# Patient Record
(demographics unavailable — no encounter records)

---

## 2024-12-27 NOTE — PHYSICAL EXAM
[No Acute Distress] : no acute distress [Well Nourished] : well nourished [PERRL] : pupils equal round and reactive to light [EOMI] : extraocular movements intact [Normal Oropharynx] : the oropharynx was normal [No Lymphadenopathy] : no lymphadenopathy [No Respiratory Distress] : no respiratory distress  [Clear to Auscultation] : lungs were clear to auscultation bilaterally [Normal Rate] : normal rate  [Normal S1, S2] : normal S1 and S2 [Soft] : abdomen soft [Non Tender] : non-tender [Non-distended] : non-distended [Normal Supraclavicular Nodes] : no supraclavicular lymphadenopathy [Normal Posterior Cervical Nodes] : no posterior cervical lymphadenopathy [Normal Anterior Cervical Nodes] : no anterior cervical lymphadenopathy [No Rash] : no rash [Coordination Grossly Intact] : coordination grossly intact [No Focal Deficits] : no focal deficits [Normal Affect] : the affect was normal [Normal Insight/Judgement] : insight and judgment were intact

## 2024-12-28 NOTE — HISTORY OF PRESENT ILLNESS
[FreeTextEntry8] : 28 y/o F presenting today as a new patient for a visit to address birth control options. She reports being sexually active with one male partner. Reports having regular menstrual cycle. LMP 12/8/24. Reports currently she uses condom for BC. Denied having any other active complaints for today.

## 2024-12-28 NOTE — HISTORY OF PRESENT ILLNESS
[FreeTextEntry8] : 26 y/o F presenting today as a new patient for a visit to address birth control options. She reports being sexually active with one male partner. Reports having regular menstrual cycle. LMP 12/8/24. Reports currently she uses condom for BC. Denied having any other active complaints for today.

## 2024-12-28 NOTE — PLAN
[FreeTextEntry1] : # Contraception management - Gave patient a pamphlet on various different contraception options - Patient will talk to her partner about it and get back to us   RTC for CPE (labs ordered) and pap smear CSP  Case d/w Dr. Felix

## 2024-12-28 NOTE — INTERPRETER SERVICES
[Interpreters_IDNumber] : [id number] [Interpreters_FullName] : Hermilo [TWNoteComboBox1] : [place]

## 2025-01-03 NOTE — PHYSICAL EXAM
[No Acute Distress] : no acute distress [Well Nourished] : well nourished [Well Developed] : well developed [Well-Appearing] : well-appearing [No Respiratory Distress] : no respiratory distress  [No Accessory Muscle Use] : no accessory muscle use [Clear to Auscultation] : lungs were clear to auscultation bilaterally [Normal Rate] : normal rate  [Regular Rhythm] : with a regular rhythm [Normal S1, S2] : normal S1 and S2 [Soft] : abdomen soft [Non Tender] : non-tender [Non-distended] : non-distended [Normal Bowel Sounds] : normal bowel sounds [Normal Affect] : the affect was normal [Normal Insight/Judgement] : insight and judgment were intact

## 2025-01-05 NOTE — REVIEW OF SYSTEMS
[Fever] : no fever [Chills] : no chills [Fatigue] : no fatigue [Chest Pain] : no chest pain [Palpitations] : no palpitations [Shortness Of Breath] : no shortness of breath [Wheezing] : no wheezing [Cough] : no cough [Abdominal Pain] : no abdominal pain [Nausea] : no nausea [Constipation] : no constipation [Diarrhea] : diarrhea [Vomiting] : no vomiting [Joint Pain] : no joint pain [Joint Stiffness] : no joint stiffness [Joint Swelling] : no joint swelling [Muscle Pain] : no muscle pain

## 2025-01-05 NOTE — HISTORY OF PRESENT ILLNESS
[FreeTextEntry1] : f/up [de-identified] : Pt is a 26 yo F with no significant PMHx presenting for pap smear and to discuss BC options. Pt is currently on her menstrual cycle, started 12/31, will defer pap smear to next visit.   Discussed contraception options with patient in detail. Pt would like to pursue Nexplanon.  Recent lab results reviewed with patient.

## 2025-01-05 NOTE — HISTORY OF PRESENT ILLNESS
[de-identified] : Pt is a 28 yo F with no significant PMHx presenting for pap smear and to discuss BC options. Pt is currently on her menstrual cycle, started 12/31, will defer pap smear to next visit.   Discussed contraception options with patient in detail. Pt would like to pursue Nexplanon.  Recent lab results reviewed with patient.       [FreeTextEntry1] : f/up

## 2025-02-01 NOTE — PAST MEDICAL HISTORY
[Menstruating] : menstruating [Definite ___ (Date)] : the last menstrual period was [unfilled] [Normal Amount/Duration] : it was of a normal amount and duration [Regular Cycle Intervals] : have been regular [Total Preg ___] : G[unfilled] [Abortions ___] : Abortions:[unfilled] [AB Spont ___] : miscarriages: [unfilled]

## 2025-02-06 NOTE — HISTORY OF PRESENT ILLNESS
[FreeTextEntry1] : CPE  [de-identified] : 27 year old female with no significant PMH presenting for annual physical exam. Reports doing well. No acute complaints. Would like pap smear today since she has never done one. No acute complaints.

## 2025-02-06 NOTE — HISTORY OF PRESENT ILLNESS
[FreeTextEntry8] : 27F presenting for immigration vaccines. She needs MMR and varicella today. She says she had her COVID vaccine yesterday in her left arm and it aches.

## 2025-02-06 NOTE — HISTORY OF PRESENT ILLNESS
[FreeTextEntry1] : CPE  [de-identified] : 27 year old female with no significant PMH presenting for annual physical exam. Reports doing well. No acute complaints. Would like pap smear today since she has never done one. No acute complaints.

## 2025-02-06 NOTE — PHYSICAL EXAM
[No Acute Distress] : no acute distress [Well Nourished] : well nourished [Well Developed] : well developed [Well-Appearing] : well-appearing [Normal Sclera/Conjunctiva] : normal sclera/conjunctiva [PERRL] : pupils equal round and reactive to light [EOMI] : extraocular movements intact [Normal Outer Ear/Nose] : the outer ears and nose were normal in appearance [Normal Oropharynx] : the oropharynx was normal [No JVD] : no jugular venous distention [No Lymphadenopathy] : no lymphadenopathy [Supple] : supple [Thyroid Normal, No Nodules] : the thyroid was normal and there were no nodules present [No Respiratory Distress] : no respiratory distress  [No Accessory Muscle Use] : no accessory muscle use [Clear to Auscultation] : lungs were clear to auscultation bilaterally [Normal Rate] : normal rate  [Regular Rhythm] : with a regular rhythm [Normal S1, S2] : normal S1 and S2 [No Murmur] : no murmur heard [No Carotid Bruits] : no carotid bruits [No Abdominal Bruit] : a ~M bruit was not heard ~T in the abdomen [No Varicosities] : no varicosities [Pedal Pulses Present] : the pedal pulses are present [No Edema] : there was no peripheral edema [No Palpable Aorta] : no palpable aorta [No Extremity Clubbing/Cyanosis] : no extremity clubbing/cyanosis [Soft] : abdomen soft [Non Tender] : non-tender [Non-distended] : non-distended [No Masses] : no abdominal mass palpated [No HSM] : no HSM [Normal Bowel Sounds] : normal bowel sounds [No Rash] : no rash [Coordination Grossly Intact] : coordination grossly intact [No Focal Deficits] : no focal deficits [Normal Gait] : normal gait [Deep Tendon Reflexes (DTR)] : deep tendon reflexes were 2+ and symmetric [Normal Affect] : the affect was normal [Normal Insight/Judgement] : insight and judgment were intact [Normal TMs] : both tympanic membranes were normal [Discharge] : ~M discharge [Heavy] : heavy [White] : white [Thin] : thin [Purulent] : purulent [No Bleeding] : no active bleeding [Atrophy] : no atrophy [Erythematous] : no erythema [Dry Mucosa] : a moist mucosa [Foreign Body] : no foreign body in the vault [Crusts] : no crusts [Vesicles] : no vesicles [Papules] : no papules [Ulcer ___cm] : no ulcers [Mass ___ cm] : no masses [de-identified] :  Introduced self to patient and explained my role as resident physician. Explained need and purpose of pap test and obtained patients consent. Chaperone: SAAD Zazueta

## 2025-02-06 NOTE — HISTORY OF PRESENT ILLNESS
[FreeTextEntry1] : CPE  [de-identified] : 27 year old female with no significant PMH presenting for annual physical exam. Reports doing well. No acute complaints. Would like pap smear today since she has never done one. No acute complaints.

## 2025-02-06 NOTE — PHYSICAL EXAM
[No Acute Distress] : no acute distress [Well Nourished] : well nourished [Well Developed] : well developed [Well-Appearing] : well-appearing [Normal Sclera/Conjunctiva] : normal sclera/conjunctiva [PERRL] : pupils equal round and reactive to light [EOMI] : extraocular movements intact [Normal Outer Ear/Nose] : the outer ears and nose were normal in appearance [Normal Oropharynx] : the oropharynx was normal [No JVD] : no jugular venous distention [No Lymphadenopathy] : no lymphadenopathy [Supple] : supple [Thyroid Normal, No Nodules] : the thyroid was normal and there were no nodules present [No Respiratory Distress] : no respiratory distress  [No Accessory Muscle Use] : no accessory muscle use [Clear to Auscultation] : lungs were clear to auscultation bilaterally [Normal Rate] : normal rate  [Regular Rhythm] : with a regular rhythm [Normal S1, S2] : normal S1 and S2 [No Murmur] : no murmur heard [No Carotid Bruits] : no carotid bruits [No Abdominal Bruit] : a ~M bruit was not heard ~T in the abdomen [No Varicosities] : no varicosities [Pedal Pulses Present] : the pedal pulses are present [No Edema] : there was no peripheral edema [No Palpable Aorta] : no palpable aorta [No Extremity Clubbing/Cyanosis] : no extremity clubbing/cyanosis [Soft] : abdomen soft [Non Tender] : non-tender [Non-distended] : non-distended [No Masses] : no abdominal mass palpated [No HSM] : no HSM [Normal Bowel Sounds] : normal bowel sounds [No Rash] : no rash [Coordination Grossly Intact] : coordination grossly intact [No Focal Deficits] : no focal deficits [Normal Gait] : normal gait [Deep Tendon Reflexes (DTR)] : deep tendon reflexes were 2+ and symmetric [Normal Affect] : the affect was normal [Normal Insight/Judgement] : insight and judgment were intact [Normal TMs] : both tympanic membranes were normal [Discharge] : ~M discharge [Heavy] : heavy [White] : white [Thin] : thin [Purulent] : purulent [No Bleeding] : no active bleeding [Atrophy] : no atrophy [Erythematous] : no erythema [Dry Mucosa] : a moist mucosa [Foreign Body] : no foreign body in the vault [Crusts] : no crusts [Vesicles] : no vesicles [Papules] : no papules [Ulcer ___cm] : no ulcers [Mass ___ cm] : no masses [de-identified] :  Introduced self to patient and explained my role as resident physician. Explained need and purpose of pap test and obtained patients consent. Chaperone: SAAD Zazueta

## 2025-02-06 NOTE — HISTORY OF PRESENT ILLNESS
[FreeTextEntry1] : CPE  [de-identified] : 27 year old female with no significant PMH presenting for annual physical exam. Reports doing well. No acute complaints. Would like pap smear today since she has never done one. No acute complaints.

## 2025-02-06 NOTE — HEALTH RISK ASSESSMENT
[Intercurrent ED visits] : went to ED [No] : In the past 12 months have you used drugs other than those required for medical reasons? No [0] : 2) Feeling down, depressed, or hopeless: Not at all (0) [PHQ-2 Negative - No further assessment needed] : PHQ-2 Negative - No further assessment needed [Never] : Never [NO] : No [HIV Test offered] : HIV Test offered [Hepatitis C test offered] : Hepatitis C test offered [Alone] : lives alone [# of Members in Household ___] :  household currently consist of [unfilled] member(s) [Employed] : employed [Significant Other] : lives with significant other [Sexually Active] : sexually active [Feels Safe at Home] : Feels safe at home [Smoke Detector] : smoke detector [Carbon Monoxide Detector] : carbon monoxide detector [Seat Belt] :  uses seat belt [Sunscreen] : uses sunscreen [de-identified] : 2 months ago; reports clorox getting into eye on R [[id number]] : 0 [Reports changes in vision] : Reports no changes in vision [Reports changes in dental health] : Reports no changes in dental health [FreeTextEntry2] : [employer] [de-identified] : Associates Degree in [place] ( Pre-university in her country)

## 2025-02-06 NOTE — PHYSICAL EXAM
[No Acute Distress] : no acute distress [No Respiratory Distress] : no respiratory distress  [Normal Insight/Judgement] : insight and judgment were intact

## 2025-02-06 NOTE — PHYSICAL EXAM
[No Acute Distress] : no acute distress [Well Nourished] : well nourished [Well Developed] : well developed [Well-Appearing] : well-appearing [Normal Sclera/Conjunctiva] : normal sclera/conjunctiva [PERRL] : pupils equal round and reactive to light [EOMI] : extraocular movements intact [Normal Outer Ear/Nose] : the outer ears and nose were normal in appearance [Normal Oropharynx] : the oropharynx was normal [No JVD] : no jugular venous distention [No Lymphadenopathy] : no lymphadenopathy [Supple] : supple [Thyroid Normal, No Nodules] : the thyroid was normal and there were no nodules present [No Respiratory Distress] : no respiratory distress  [No Accessory Muscle Use] : no accessory muscle use [Clear to Auscultation] : lungs were clear to auscultation bilaterally [Normal Rate] : normal rate  [Regular Rhythm] : with a regular rhythm [Normal S1, S2] : normal S1 and S2 [No Murmur] : no murmur heard [No Carotid Bruits] : no carotid bruits [No Abdominal Bruit] : a ~M bruit was not heard ~T in the abdomen [No Varicosities] : no varicosities [Pedal Pulses Present] : the pedal pulses are present [No Edema] : there was no peripheral edema [No Palpable Aorta] : no palpable aorta [No Extremity Clubbing/Cyanosis] : no extremity clubbing/cyanosis [Soft] : abdomen soft [Non Tender] : non-tender [Non-distended] : non-distended [No Masses] : no abdominal mass palpated [No HSM] : no HSM [Normal Bowel Sounds] : normal bowel sounds [No Rash] : no rash [Coordination Grossly Intact] : coordination grossly intact [No Focal Deficits] : no focal deficits [Normal Gait] : normal gait [Deep Tendon Reflexes (DTR)] : deep tendon reflexes were 2+ and symmetric [Normal Affect] : the affect was normal [Normal Insight/Judgement] : insight and judgment were intact [Normal TMs] : both tympanic membranes were normal [Discharge] : ~M discharge [Heavy] : heavy [White] : white [Thin] : thin [Purulent] : purulent [No Bleeding] : no active bleeding [Atrophy] : no atrophy [Erythematous] : no erythema [Dry Mucosa] : a moist mucosa [Foreign Body] : no foreign body in the vault [Crusts] : no crusts [Vesicles] : no vesicles [Papules] : no papules [Ulcer ___cm] : no ulcers [Mass ___ cm] : no masses [de-identified] :  Introduced self to patient and explained my role as resident physician. Explained need and purpose of pap test and obtained patients consent. Chaperone: SAAD Zazueta

## 2025-02-06 NOTE — HEALTH RISK ASSESSMENT
[Intercurrent ED visits] : went to ED [No] : In the past 12 months have you used drugs other than those required for medical reasons? No [0] : 2) Feeling down, depressed, or hopeless: Not at all (0) [PHQ-2 Negative - No further assessment needed] : PHQ-2 Negative - No further assessment needed [Never] : Never [NO] : No [HIV Test offered] : HIV Test offered [Hepatitis C test offered] : Hepatitis C test offered [Alone] : lives alone [# of Members in Household ___] :  household currently consist of [unfilled] member(s) [Employed] : employed [Significant Other] : lives with significant other [Sexually Active] : sexually active [Feels Safe at Home] : Feels safe at home [Smoke Detector] : smoke detector [Carbon Monoxide Detector] : carbon monoxide detector [Seat Belt] :  uses seat belt [Sunscreen] : uses sunscreen [de-identified] : 2 months ago; reports clorox getting into eye on R [[id number]] : 0 [Reports changes in vision] : Reports no changes in vision [Reports changes in dental health] : Reports no changes in dental health [FreeTextEntry2] : [employer] [de-identified] : Associates Degree in [place] ( Pre-university in her country)

## 2025-02-06 NOTE — HEALTH RISK ASSESSMENT
[Intercurrent ED visits] : went to ED [No] : In the past 12 months have you used drugs other than those required for medical reasons? No [0] : 2) Feeling down, depressed, or hopeless: Not at all (0) [PHQ-2 Negative - No further assessment needed] : PHQ-2 Negative - No further assessment needed [Never] : Never [NO] : No [HIV Test offered] : HIV Test offered [Hepatitis C test offered] : Hepatitis C test offered [Alone] : lives alone [# of Members in Household ___] :  household currently consist of [unfilled] member(s) [Employed] : employed [Significant Other] : lives with significant other [Sexually Active] : sexually active [Feels Safe at Home] : Feels safe at home [Smoke Detector] : smoke detector [Carbon Monoxide Detector] : carbon monoxide detector [Seat Belt] :  uses seat belt [Sunscreen] : uses sunscreen [de-identified] : 2 months ago; reports clorox getting into eye on R [[id number]] : 0 [Reports changes in vision] : Reports no changes in vision [Reports changes in dental health] : Reports no changes in dental health [FreeTextEntry2] : [employer] [de-identified] : Associates Degree in [place] ( Pre-university in her country)

## 2025-02-06 NOTE — PHYSICAL EXAM
[No Acute Distress] : no acute distress [Well Nourished] : well nourished [Well Developed] : well developed [Well-Appearing] : well-appearing [Normal Sclera/Conjunctiva] : normal sclera/conjunctiva [PERRL] : pupils equal round and reactive to light [EOMI] : extraocular movements intact [Normal Outer Ear/Nose] : the outer ears and nose were normal in appearance [Normal Oropharynx] : the oropharynx was normal [No JVD] : no jugular venous distention [No Lymphadenopathy] : no lymphadenopathy [Supple] : supple [Thyroid Normal, No Nodules] : the thyroid was normal and there were no nodules present [No Respiratory Distress] : no respiratory distress  [No Accessory Muscle Use] : no accessory muscle use [Clear to Auscultation] : lungs were clear to auscultation bilaterally [Normal Rate] : normal rate  [Regular Rhythm] : with a regular rhythm [Normal S1, S2] : normal S1 and S2 [No Murmur] : no murmur heard [No Carotid Bruits] : no carotid bruits [No Abdominal Bruit] : a ~M bruit was not heard ~T in the abdomen [No Varicosities] : no varicosities [Pedal Pulses Present] : the pedal pulses are present [No Edema] : there was no peripheral edema [No Palpable Aorta] : no palpable aorta [No Extremity Clubbing/Cyanosis] : no extremity clubbing/cyanosis [Soft] : abdomen soft [Non Tender] : non-tender [Non-distended] : non-distended [No Masses] : no abdominal mass palpated [No HSM] : no HSM [Normal Bowel Sounds] : normal bowel sounds [No Rash] : no rash [Coordination Grossly Intact] : coordination grossly intact [No Focal Deficits] : no focal deficits [Normal Gait] : normal gait [Deep Tendon Reflexes (DTR)] : deep tendon reflexes were 2+ and symmetric [Normal Affect] : the affect was normal [Normal Insight/Judgement] : insight and judgment were intact [Normal TMs] : both tympanic membranes were normal [Discharge] : ~M discharge [Heavy] : heavy [White] : white [Thin] : thin [Purulent] : purulent [No Bleeding] : no active bleeding [Atrophy] : no atrophy [Erythematous] : no erythema [Dry Mucosa] : a moist mucosa [Foreign Body] : no foreign body in the vault [Crusts] : no crusts [Vesicles] : no vesicles [Papules] : no papules [Ulcer ___cm] : no ulcers [Mass ___ cm] : no masses [de-identified] :  Introduced self to patient and explained my role as resident physician. Explained need and purpose of pap test and obtained patients consent. Chaperone: SAAD Zazueta

## 2025-02-06 NOTE — HEALTH RISK ASSESSMENT
[Intercurrent ED visits] : went to ED [No] : In the past 12 months have you used drugs other than those required for medical reasons? No [0] : 2) Feeling down, depressed, or hopeless: Not at all (0) [PHQ-2 Negative - No further assessment needed] : PHQ-2 Negative - No further assessment needed [Never] : Never [NO] : No [HIV Test offered] : HIV Test offered [Hepatitis C test offered] : Hepatitis C test offered [Alone] : lives alone [# of Members in Household ___] :  household currently consist of [unfilled] member(s) [Employed] : employed [Significant Other] : lives with significant other [Sexually Active] : sexually active [Feels Safe at Home] : Feels safe at home [Smoke Detector] : smoke detector [Carbon Monoxide Detector] : carbon monoxide detector [Seat Belt] :  uses seat belt [Sunscreen] : uses sunscreen [de-identified] : 2 months ago; reports clorox getting into eye on R [[id number]] : 0 [Reports changes in vision] : Reports no changes in vision [Reports changes in dental health] : Reports no changes in dental health [FreeTextEntry2] : [employer] [de-identified] : Associates Degree in [place] ( Pre-university in her country)

## 2025-03-11 NOTE — PROCEDURE
[Colposcopy] : colposcopy [LGSIL] : low grade squamous intraepithelial lesion [Patient] : patient [Risks] : risks [Benefits] : benefits [Alternatives] : alternatives [Infection] : infection [Bleeding] : bleeding [Allergic Reaction] : allergic reaction [Consent Obtained] : written consent was obtained prior to the procedure [Ibuprofen ___ mg] : ibuprofen [unfilled] ~Umg [Acetowhite ___ o'clock] : ascetowhite changes at [unfilled] ~Uo'clock [Non-staining ___ o'clock] : no staining at [unfilled] o'clock [No Abnormalities] : no abnormalities seen [Biopsies Taken: # ___] : [unfilled] biopsies taken of the cervix [Biopsy Locations ___ o'clock] : the biopsies were taken at [unfilled] o'clock [ECC Done] : Endocervical curettage was performed.  [Orlando's] : Orlando's solution [Tolerated Well] : the patient tolerated the procedure well [No Complications] : there were no complications [Pap Performed] : a cervical Pap smear was not performed [SCJ Fully Visulized] : the squamocolumnar junction was not fully visualized [FreeTextEntry3] : HPV colpitis at 12 o'clock

## 2025-03-27 NOTE — HISTORY OF PRESENT ILLNESS
[FreeTextEntry1] : Patient presenting for f/u on colposcopy result [de-identified] : 28 yo F with LSIL, recent colposcopy presenting for f/u  Patient has no acute complaints today, colposcopy showed ADRI 2 in background of LSIL, patient given options including monitoring with pap smear in 6 months or referral for LEEP.

## 2025-03-27 NOTE — ASSESSMENT
[FreeTextEntry1] : 26 yo F with ADRI 2 on colposcopy, to f/u with pap smear in 6 months. HPV vaccine in clinic today.

## 2025-03-27 NOTE — ASSESSMENT
[FreeTextEntry1] : 28 yo F with ADRI 2 on colposcopy, to f/u with pap smear in 6 months. HPV vaccine in clinic today.

## 2025-03-27 NOTE — HISTORY OF PRESENT ILLNESS
[FreeTextEntry1] : Patient presenting for f/u on colposcopy result [de-identified] : 26 yo F with LSIL, recent colposcopy presenting for f/u  Patient has no acute complaints today, colposcopy showed ADRI 2 in background of LSIL, patient given options including monitoring with pap smear in 6 months or referral for LEEP.

## 2025-03-27 NOTE — HISTORY OF PRESENT ILLNESS
[FreeTextEntry1] : Patient presenting for f/u on colposcopy result [de-identified] : 26 yo F with LSIL, recent colposcopy presenting for f/u  Patient has no acute complaints today, colposcopy showed ADRI 2 in background of LSIL, patient given options including monitoring with pap smear in 6 months or referral for LEEP.

## 2025-03-27 NOTE — INTERPRETER SERVICES
[Time Spent: ____ minutes] : Total time spent using  services: [unfilled] minutes. The patient's primary language is not English thus required  services. [Interpreters_IDNumber] : [id number] [Interpreters_FullName] : Parish [TWNoteComboBox1] : [place]

## 2025-03-27 NOTE — INTERPRETER SERVICES
[Time Spent: ____ minutes] : Total time spent using  services: [unfilled] minutes. The patient's primary language is not English thus required  services. [Interpreters_IDNumber] : [id number] [Interpreters_FullName] : Parish [TWNoteComboBox1] : [place]

## 2025-03-27 NOTE — HISTORY OF PRESENT ILLNESS
[FreeTextEntry1] : Patient presenting for f/u on colposcopy result [de-identified] : 26 yo F with LSIL, recent colposcopy presenting for f/u  Patient has no acute complaints today, colposcopy showed ADRI 2 in background of LSIL, patient given options including monitoring with pap smear in 6 months or referral for LEEP.

## 2025-03-27 NOTE — INTERPRETER SERVICES
[Time Spent: ____ minutes] : Total time spent using  services: [unfilled] minutes. The patient's primary language is not English thus required  services. [Interpreters_IDNumber] : [id number] [Interpreters_FullName] : Parish [TWNoteComboBox1] : [place]

## 2025-03-27 NOTE — INTERPRETER SERVICES
[Time Spent: ____ minutes] : Total time spent using  services: [unfilled] minutes. The patient's primary language is not English thus required  services. [Interpreters_IDNumber] : [id number] [Interpreters_FullName] : Parish [TWNoteComboBox1] : [place]

## 2025-05-19 NOTE — PHYSICAL EXAM
[No Acute Distress] : no acute distress [Well Nourished] : well nourished [Well Developed] : well developed [PERRL] : pupils equal round and reactive to light [EOMI] : extraocular movements intact [No Lymphadenopathy] : no lymphadenopathy [No Respiratory Distress] : no respiratory distress  [Clear to Auscultation] : lungs were clear to auscultation bilaterally [Normal Rate] : normal rate  [Normal S1, S2] : normal S1 and S2 [Soft] : abdomen soft [Non Tender] : non-tender [Non-distended] : non-distended [Normal Supraclavicular Nodes] : no supraclavicular lymphadenopathy [Normal Posterior Cervical Nodes] : no posterior cervical lymphadenopathy [Normal Anterior Cervical Nodes] : no anterior cervical lymphadenopathy [No Rash] : no rash [Coordination Grossly Intact] : coordination grossly intact [No Focal Deficits] : no focal deficits [Normal Affect] : the affect was normal [Normal Insight/Judgement] : insight and judgment were intact

## 2025-05-22 NOTE — HISTORY OF PRESENT ILLNESS
[FreeTextEntry8] : 28 y/o F presenting for a 2nd dose of HPV vaccine. 1st dose was on march 21, 25. Of note, patient had a cervical biopsy that showed Focal high grade squamous intraepithelial lesion (HGSIL) (ADRI 2).

## 2025-05-22 NOTE — PLAN
[FreeTextEntry1] : - RTC 4 months for the 3rd and final dose of HPV vaccine  - patient does have HSIL and will need pap q6 months for close surveillance - All questions answered   Case d/w Dr. Clinton

## 2025-05-22 NOTE — HISTORY OF PRESENT ILLNESS
[FreeTextEntry8] : 26 y/o F presenting for a 2nd dose of HPV vaccine. 1st dose was on march 21, 25. Of note, patient had a cervical biopsy that showed Focal high grade squamous intraepithelial lesion (HGSIL) (ADRI 2).